# Patient Record
(demographics unavailable — no encounter records)

---

## 2024-11-04 NOTE — HEALTH RISK ASSESSMENT
[Good] : ~his/her~  mood as  good [No falls in past year] : Patient reported no falls in the past year [Little interest or pleasure doing things] : 1) Little interest or pleasure doing things [Feeling down, depressed, or hopeless] : 2) Feeling down, depressed, or hopeless [0] : 2) Feeling down, depressed, or hopeless: Not at all (0) [PHQ-2 Negative - No further assessment needed] : PHQ-2 Negative - No further assessment needed [[id number]] : 0 [Never] : Never [Change in mental status noted] : No change in mental status noted [Language] : denies difficulty with language [Behavior] : denies difficulty with behavior [None] : None [With Significant Other] : lives with significant other [] :  [Fully functional (bathing, dressing, toileting, transferring, walking, feeding)] : Fully functional (bathing, dressing, toileting, transferring, walking, feeding) [Fully functional (using the telephone, shopping, preparing meals, housekeeping, doing laundry, using] : Fully functional and needs no help or supervision to perform IADLs (using the telephone, shopping, preparing meals, housekeeping, doing laundry, using transportation, managing medications and managing finances) [Reports changes in hearing] : Reports no changes in hearing

## 2024-11-04 NOTE — HISTORY OF PRESENT ILLNESS
[de-identified] : Pt presents to the office today for annual wellness exam and FBW Pt has been feeling well overall.  Diet has improved and pt lost 20lbs Still not able to exercise secondary to knee pains  Neurology no follow up needed Urology Next month Cardiology next month  Last colonoscopy at 72 no further testing needed.  No polyps

## 2024-11-04 NOTE — PLAN
[FreeTextEntry1] : FBW done in office today  Continue specialist follow ups See Dermatology for skin cancer screen Continue with current medications Follow up again with me in 6 months or before then if needed

## 2024-11-12 NOTE — IMAGING
[Left] : left shoulder [Degenerative change] : Degenerative change [Glenohumeral arthritis] : Glenohumeral arthritis

## 2024-11-12 NOTE — HISTORY OF PRESENT ILLNESS
[6] : 6 [0] : 0 [Localized] : localized [Retired] : Work status: retired [de-identified] : 11/12/24: Here for follow up.  he noticed increasing pain in the left shoulder.  No specific injury.  He feels pain in the front.  He hears cracking the shoulder.  No treatment.   The GH injection on the R shoulder worked very well, he still feels relief.   2/9/24: 76 yo RHD male with bilateral shoulder pain since 2/7/24. He reports waking up with left shoulder pain, no specific injury. He saw Dr. Bar and took muscle relaxer. He reports increased pain in his right shoulder now. There is pain when turning his head. He is unable to take NSAIDs due to prior kidney CA.  PMHx: prostate CA [] : Post Surgical Visit: no [FreeTextEntry5] : Here for LT shoulder pain. RT hand dominant. Patient states no injury. A couple weeks ago patient has been noticing localized pain in LT shoulder. Patient states he is getting his knee replaced so when he gets up to help support the knee he usses his left shoulder to press down. Mobility and strength are there, he states it is an "annoyance" and some pain. No updates to medication.  [de-identified] : [facility]

## 2024-11-12 NOTE — PHYSICAL EXAM
[5 ___] : forward flexion 5[unfilled]/5 [5___] : internal rotation 5[unfilled]/5 [Left] : left shoulder [] : discomfort with strength testing [FreeTextEntry9] : FE: 150 ER: 50

## 2024-11-12 NOTE — ASSESSMENT
[FreeTextEntry1] : B/L GH DJD, cervical DDD. New xrays L shoulder reviewed.  Localized pain to AC joint today.  R GH injection given 2/9/24 with relief. L Ac injection today. RTO prn.  Procedure Note: Large Joint Injection was performed because of pain and inflammation, failure of conservative treatment.   Medications: Depo-Medrol: 1 cc, 80 mg. Lidocaine: 1 cc, 1%.  Marcaine: 1 cc, .25%.   Medication was injected in the left acromioclavicular joint. Patient has tried OTC's including aspirin, Ibuprofen, Aleve etc or prescription NSAIDs, and/or exercises at home and/ or physical therapy without satisfactory response. The risks, benefits, and alternatives to cortisone injection were explained in full to the patient. Risks outlined include but are not limited to infection, sepsis, bleeding, scarring, skin discoloration, temporary increase in pain, syncopal episode, failure to resolve symptoms, allergic reaction, symptom recurrence, and elevation of blood sugar in diabetics. Patient understood the risks. All questions were answered. After discussion of options, patient requested an injection. Oral informed consent was obtained and sterile prep of the injection site was performed using alcohol. Sterile technique was utilized for the procedure including the preparation of the solutions used for the injection. Ethyl chloride spray was used topically.  Sterile technique used. Patient tolerated procedure well. Post Procedure Instructions: Patient was advised to call if redness, pain, or fever occur and apply ice for 15 min. out of every hour for the next 12-24 hours as tolerated. patient was advised to rest the joint(s) for 2 days.  Ultrasound Guidance was used for the following reasons: to visualize the AC joint. Ultrasound guided injection was performed of the shoulder, visualization of the needle and placement of injection was performed without complication.

## 2024-12-05 NOTE — HISTORY OF PRESENT ILLNESS
[FreeTextEntry1] : 77 Y/O male PMH: HTN, HLD, GLENN treated with CPAP is not using CPAP here today for  cardiac evaluation fCP/SOB/Palpitations/dizziness/syncope. Pt had successful nephrectomy 8/5/22. Previous notes were reviewed. Previous labs reviewed. Previous PMD notes were reviewed. Pt continues to be overweight but is unable to exercise because knee pain for which he is receiving injections. He denies chest pain or shortness of breath. He is asymptomatic walking up and down stairs at home.   Medications/Testing reviewed  Echo reviewed EKG NSR

## 2024-12-05 NOTE — DISCUSSION/SUMMARY
[FreeTextEntry1] :  Pt is hemodynamically compensated. He will follow up in  6 mos. He was encouraged to exercise and lose weight. Labs and PMD notes were reviewed.Lipids were reviewed. Echo ordered to evaluate valvular disease. [EKG obtained to assist in diagnosis and management of assessed problem(s)] : EKG obtained to assist in diagnosis and management of assessed problem(s)

## 2024-12-12 NOTE — PROCEDURE
[Large Joint Injection] : Large joint injection [Bilateral] : bilaterally of the [Knee] : knee [Pain] : pain [Alcohol] : alcohol [Betadine] : betadine [Ethyl Chloride sprayed topically] : ethyl chloride sprayed topically [Sterile technique used] : sterile technique used [___ cc    32 units 5mg] : Zilretta ~Vcc of 32 units 5 mg  [] : Patient tolerated procedure well [Patient was advised to rest the joint(s) for ____ days] : patient was advised to rest the joint(s) for [unfilled] days [Risks, benefits, alternatives discussed / Verbal consent obtained] : the risks benefits, and alternatives have been discussed, and verbal consent was obtained [Altered anatomic landmarks d/t erosive arthritis] : altered anatomic landmarks d/t erosive arthritis [All ultrasound images have been permanently captured and stored accordingly in our picture archiving and communication system] : All ultrasound images have been permanently captured and stored accordingly in our picture archiving and communication system [FreeTextEntry3] : Do not submerge underwater for 48 hours

## 2024-12-12 NOTE — DISCUSSION/SUMMARY
[de-identified] : I discussed repeat Zilretta injections in both knees. Risks including infection, swelling, stiffness, bleeding in addition to other associated risks with joint injection, benefits and alternatives were discussed with the patient He would like to do this He had seen Dr. Quan Cassidy for consult for total knee replacements.  Recommend he consider doing this.   Tylenol p.r.n. Unable to take NSAIDs because of history of kidney cancer and nephrectomy August 2022 He will avoid irritating activities Hyaluronate injections have not helped him   Impression: Severe osteoarthritis right knee Severe osteoarthritis left knee

## 2024-12-12 NOTE — HISTORY OF PRESENT ILLNESS
[Gradual] : gradual [Dull/Aching] : dull/aching [Constant] : constant [Retired] : Work status: retired [1] : 1 [Other:____] : [unfilled] [de-identified] : The patient has had increased pain in both knees over the past several weeks.  He has mild to moderate pain standing and walking.  Pain with stairs and movie sign.  The left knee bothers him more than the right.  He did have good improvement after previous Zilretta injections.  Doing home exercises.  No improvement after previous hyaluronate injections [] : Post Surgical Visit: no [de-identified] : 8/12/24 [de-identified] : Dr. Martin [de-identified] : 8/12/24

## 2025-02-25 NOTE — PHYSICAL EXAM
[Left] : left shoulder [5 ___] : forward flexion 5[unfilled]/5 [5___] : internal rotation 5[unfilled]/5 [] : positive Elena [FreeTextEntry9] : FE: 120A ER: 30A

## 2025-02-25 NOTE — ASSESSMENT
[FreeTextEntry1] : B/L GH DJD, cervical DDD. New xrays L shoulder reviewed.  Localized pain to AC joint today.  R GH injection given 2/9/24 with relief. Repeat L AC injection today. RTO prn.  Procedure Note: Large Joint Injection was performed because of pain and inflammation, failure of conservative treatment.   Medications: Depo-Medrol: 1 cc, 80 mg. Lidocaine: 1 cc, 1%.  Marcaine: 1 cc, .25%.   Medication was injected in the left acromioclavicular joint. Patient has tried OTC's including aspirin, Ibuprofen, Aleve etc or prescription NSAIDs, and/or exercises at home and/ or physical therapy without satisfactory response. The risks, benefits, and alternatives to cortisone injection were explained in full to the patient. Risks outlined include but are not limited to infection, sepsis, bleeding, scarring, skin discoloration, temporary increase in pain, syncopal episode, failure to resolve symptoms, allergic reaction, symptom recurrence, and elevation of blood sugar in diabetics. Patient understood the risks. All questions were answered. After discussion of options, patient requested an injection. Oral informed consent was obtained and sterile prep of the injection site was performed using alcohol. Sterile technique was utilized for the procedure including the preparation of the solutions used for the injection. Ethyl chloride spray was used topically.  Sterile technique used. Patient tolerated procedure well. Post Procedure Instructions: Patient was advised to call if redness, pain, or fever occur and apply ice for 15 min. out of every hour for the next 12-24 hours as tolerated. patient was advised to rest the joint(s) for 2 days.  Ultrasound Guidance was used for the following reasons: to visualize the AC joint. Ultrasound guided injection was performed of the shoulder, visualization of the needle and placement of injection was performed without complication.

## 2025-02-25 NOTE — HISTORY OF PRESENT ILLNESS
[9] : 9 [1] : 2 [Localized] : localized [Sharp] : sharp [Throbbing] : throbbing [Sleep] : sleep [Retired] : Work status: retired [de-identified] : 2/25/25: Here for follow up, bilateral shoulders.  He was given L AC joint injection last visit with excellent relief up until this past Sunday.   Pain is anterior, worse with lifting and reaching. He has improved.   11/12/24: Here for follow up.  he noticed increasing pain in the left shoulder.  No specific injury.  He feels pain in the front.  He hears cracking the shoulder.  No treatment.   The GH injection on the R shoulder worked very well, he still feels relief.   2/9/24: 76 yo RHD male with bilateral shoulder pain since 2/7/24. He reports waking up with left shoulder pain, no specific injury. He saw Dr. Bar and took muscle relaxer. He reports increased pain in his right shoulder now. There is pain when turning his head. He is unable to take NSAIDs due to prior kidney CA.  PMHx: prostate CA [] : Post Surgical Visit: no [FreeTextEntry5] : LT CSI gave temporary relief until this past Sunday, he could not lift shoulder due to sharp pains... then this morning it calmed down and better he states. *Would like another CSI** [de-identified] : CSI [de-identified] : [facility]

## 2025-03-22 NOTE — IMAGING
[de-identified] :  RIGHT and LEFT KNEE Inspection:  mild effusion Palpation: medial joint line tenderness, anterior tenderness Knee Range of Motion:  3-125  Strength: 5/5 Quadriceps strength, 5/5 Hamstring strength Neurological: light touch is intact throughout Ligament Stability and Special Tests:  McMurrays: neg Lachman: neg Pivot Shift: neg Posterior Drawer: neg Valgus: neg Varus: neg Patella Apprehension: neg Patella Maltracking: neg

## 2025-03-22 NOTE — HISTORY OF PRESENT ILLNESS
[10] : 10 [Localized] : localized [Constant] : constant [Walking] : walking [Stairs] : stairs [de-identified] : 76M Here with bilateral knee pain. hx OA. He is considering TKA.   [] : Post Surgical Visit: no [FreeTextEntry1] : Left knee  [FreeTextEntry3] : 1 week ago [FreeTextEntry5] : pt states no known injury, he is unable to bend his knee. pt had a csi on 12/12/2024 which only lasted for 3 months  [de-identified] : movement  [de-identified] : 12/12/2024 [de-identified] : Veronica [de-identified] : 20-30 years ago [de-identified] : x-ray

## 2025-03-22 NOTE — ASSESSMENT
[FreeTextEntry1] : XR with severe B knee ASp tolerated well 45cc L, 60cc R Consult TKA Short supply tramadol

## 2025-04-24 NOTE — DISCUSSION/SUMMARY
[de-identified] : Diagnosis Knee Osteoarthritis   VILMA EDMOND 76 year  M was seen and evaluated in the office today. Following evaluation, and history of the patient's condition at length, the pathology was explained in full to the patient in layman's terms. Patient has Knee Osteoarthritis. Osteoarthritis is a degenerative joint disease where the cartilage in the knee gradually wears away, leading to pain, stiffness, and reduced mobility. Initially, symptoms may be mild, however this is a progressive condition, and the pain is expected to become more severe and persistent. Advanced stages of knee OA can result in significant disability, making daily activities challenging. I discussed with the patient that since this condition is expected to continue to worsen, they will eventually need a total knee replacement in their life time.   In the interim, discussed with patient several different treatment options regarding managing the gradual loss of function associated with knee OA, along with specific risks and benefits. Nonsurgical options including but not limited to Corticosteroid Injection, Visco Supplementation, Meloxicam 15mg, Medrol Dose Pack, along with activity modification such as non-impact exercise and organized physical therapy. The risk of morbidity associated with proposed treatments were discussed.   Furthermore, discussed with VILMA that they could also delay any immediate treatment options and continue to observe and self-care for the discussed problem. Discussed Home Exercise Programs as well as Rest, Ice and elevation. Patient had ample time to ask any questions about todays visit and the diagnosis, and all questions were answered. Patient verbally expressed they understand the plan going forward.  At this time, indicated patient for Meloxicam 15mg due to pain and inflammation of the knee. Upon review of patient medical history and confirmation with patient, patient had a nephrectomy. Discussed that due to this prescription NSAIDs are contraindicated.   Assessment & Indication: The patient has progressively severe knee pain and disability secondary to degenerative joint disease (DJD) and has failed extensive nontreatments, including activity modification, analgesic medications, and therapeutic exercise. Based on persistent symptoms and lack of response to non-surgical management, I have recommended a LEFT Total Knee Replacement (TKR).  Procedure Discussion: A detailed discussion was held regarding the nature of the total knee replacement procedure, including surgical steps, expected recovery timeline, and anticipated outcomes. A model of the implant to be used was utilized to demonstrate the various bearing surfaces and functionality.  Expected Recovery & Rehabilitation: The patient was informed of the expected time course for return of function and pain relief. We discussed the importance of compliance with postoperative instructions to facilitate optimal recovery and minimize complications. Emphasis was placed on active participation in rehabilitation and its impact on both short- and long-term outcomes. The discharge plan includes home care nursing and physical therapy, provided it is appropriate and covered by the patients insurance. The patient was encouraged to arrange for assistance at home following surgery.  Outcomes & Expectations: We reviewed the expected surgical outcomes, the likelihood of satisfaction after full recovery, and potential causes of dissatisfaction, including the possibility of recurrent pain, lack of improvement, or worsening pain.  Risks & Complications: The patient was informed in detail about the risks associated with total knee replacement, including but not limited to:  Surgical risks: Infection, wound complications, bone fracture, tendon or ligament injury, nerve injury, vascular injury, hemorrhage, stiffness, instability, persistent pain, fixation failure, need for reoperation or manipulation under anesthesia.  Perioperative medical risks: Deep vein thrombosis (DVT), pulmonary embolism, heart attack, stroke, cardiopulmonary complications, and other risks related to medical comorbidities, including elevated body mass index (BMI).  Anesthesia risks: Potential complications related to anesthesia, including death, were also discussed. Though I defer to the anesthesia team to discuss complications of anesthesia procedures.  To mitigate these risks, we will use sterile technique, perioperative antibiotics, and DVT prophylaxis when appropriate. The patient will be monitored postoperatively in the office setting to track recovery progress.  Implant Selection & Durability: We discussed the durability of prosthetic knees and potential long-term concerns, including wear, osteolysis, and loosening. I plan to use a [employer], possibly uncemented to be determined based on bone quality implant, which I feel most comfortable utilizing for primary TKR. If the patient prefers a different implant brand/type, they may request it, and I will consider the request or suggest an additional surgical opinion from a surgeon using that brand.  Preoperative Medical Clearance: The patient was advised of the need for a preoperative medical risk evaluation by their primary care physician (PCP). Additional subspecialty clearances, such as cardiac evaluation, may be required based on the PCPs assessment.  Informed Consent & Next Steps: The risks, benefits, and alternatives to surgery were discussed at length. The patient actively participated in the discussion, had their questions answered, and agreed to proceed with elective TKR. They were instructed to coordinate with my surgical team for scheduling, preoperative testing, and medical optimization.  Follow-up will be arranged for surgery, or sooner if needed.  Entered by Marcia Hu acting as scribe. Dr. Cassidy Attestation The documentation recorded by the scribe, in my presence, accurately reflects the service I, Dr. Cassidy, personally performed, and the decisions made by me with my edits as appropriate.

## 2025-04-24 NOTE — PHYSICAL EXAM
[Bilateral] : knee bilaterally [AP] : anteroposterior [Lateral] : lateral [[place]] : skyline [de-identified] : Constitutional: The patient appears well developed, well nourished. Examination of patients ability to communicate functionally was normal.       Neurologic: Coordination is normal. Alert and oriented to time, place and person. No evidence of mood disorder, calm affect.           LEFT  KNEE  : Inspection of the knee is as follows: moderate  TO LARGE effusion. no ecchymosis, no streaking, no erythema, no atrophy, no deformities of the quad tendon and no deformities of patellar tendon.     SCARS NOTED LEFT KNEE WITHOUT SIGNS OF INFECTION   Palpation of the knee is as follows: medial joint line tenderness, lateral joint line tenderness, medial facet of patella tenderness, lateral facet of patella tenderness and crepitus. no palpable masses and no increased warmth.       Knee Range of Motion is as follows in degrees:        Extension: 25    Flexion: 85        Strength examination of the knee is as follows:    Quadriceps strength is 5/5    Hamstring strength is 5/5       Ligament Stability and Special Test ligamentously stable, negative anterior draw, negative Lachman test, negative posterior draw and no varus or valgus instability.    negative McMurrays test and able to do active straight leg raise without an extensor lag.       Neurological examination of the knee is as follows: light touch is intact throughout.       Gait and function is as follows:  antalgic gait.          RIGHT    KNEE  : Inspection of the knee is as follows: moderate effusion. no ecchymosis, no streaking, no erythema, no atrophy, no deformities of the quad tendon and no deformities of patellar tendon.       Palpation of the knee is as follows: medial joint line tenderness, lateral joint line tenderness, medial facet of patella tenderness, lateral facet of patella tenderness and crepitus. no palpable masses and no increased warmth.       Knee Range of Motion is as follows in degrees:        Extension: 15    Flexion: 105        Strength examination of the knee is as follows:    Quadriceps strength is 5/5    Hamstring strength is 5/5       Ligament Stability and Special Test ligamentously stable, negative anterior draw, negative Lachman test, negative posterior draw and no varus or valgus instability.    negative McMurrays test and able to do active straight leg raise without an extensor lag.       Neurological examination of the knee is as follows: light touch is intact throughout.        [FreeTextEntry9] : ap/lat/sunrise taken 12/24 show b/l severe tricompartmental DJD near medial joint bone on bone contact and severe PF spurring/joint space narrowing KL grade 4. no frx noted.

## 2025-04-24 NOTE — HISTORY OF PRESENT ILLNESS
[de-identified] : 04/24/2025  VILMA EDMOND 76 year y/o M presents today for bilateral knee pain. Patient reports his left knee is worse than his right knee at this time.  He admits to decreased ROM, limping, difficulty with stairs.     Date of Onset: A few years, progressing over the last few weeks Mechanism of injury: NKI   Pain:    At Rest: 8/10    With Activity: 8/10   Have you been treated for this in the past? Patient reports he was recently in [facility] due to his knee pain. Patient reports he had IV steroids with relief. Patient reports he was admitted to a rehab facility for about 1 week.  Patient reports he has been recently treated by Dr. Martin. Patient reports he has had zilretta injections in bilateral knees (last injection was 12/12/24) and gel series with no relief. Patient reports he saw Dr. Cassidy about 2.5 years ago and was going to schedule a TKA but was dx with a cancerous mass on his kidney.  OTC/Rx Medication: Patient reports he has 1 kidney and cannot take NSAIDs. Approx 2.5 yrs ago he had nephrectomy due to renal cell CA Heat, Ice, Elevation: Previous Imaging/Studies: XR in office

## 2025-04-29 NOTE — ASSESSMENT
[FreeTextEntry1] : 4/29/25: Pt with adv varus b/l knee OA (L>R) with sig decreased ROM. Discussed treatment options- risks and benefits explained. Explained at this point due to his deformity and sig loss of motion would recommend proceeding surgically with TKA- unlikely will have sig long-term benefit from conservative tx. He would like to proceed with L TKA MAYTE first and R TKA MAYTE staged 6 weeks later. Explained he may have difficulty with postop recovery due to his very limited ROM in both knees- discussed importance of PT. Will book him for full stay as he has had trouble with kidney function and was recently discharged from rehab after having difficulty with ambulation. Will obtain CT scan [facility] protocol for both knees. Surgical details discussed. All pt questions answered. Will need oncologist clearance (monitoring his kidney function). Also will need Eliquis postop.

## 2025-04-29 NOTE — DISCUSSION/SUMMARY
[de-identified] : We had an extensive discussion regarding surgical intervention including risk, benefits and alternatives.  The risks include, but are not limited to infection, bleeding, injury to small nerves and blood vessels, pain, stiffness, phlebitis, DVT malunion, nonunion, and need for secondary procedures.  Preoperative, intraoperative and postoperative care were discussed and outlined to the patient as well.  The patient has had an opportunity to ask any question and all were answered to the best of my ability.  The natural progression of Osteoarthritis was explained to the patient. We discussed the possible treatment options from conservative to operative. These included NSAIDS, Glucosamine and Chondroitin sulfate, and Physical Therapy as well different types of injections. We also discussed that at some point they may progress to needing a TKA.  Information and pamphlets were given when appropriate.   Patient Complains of pain in Knee with a level that often reaches greater than an 8/10. The Pain has been progressively worsening of his/her treatment course. The pain has interfered with their ADLs and worsens with weight bearing. On exam they often have episodes of swelling/effusion with limited ROM. Pain worsens with ROM passive and active and I can palpate crepitus.   X-rays were reviewed with the patient, and they show joint space narrowing, subchondral sclerosis, osteophyte formation, and subchondral cysts.   After a period of more than 12 weeks physical therapy or exercise program done with me or another treating physician, they have continued pain. The patient has failed a trial of NSAID medication or pain relievers if they were unable to tolerate NSAID medications as well as a series of injection, steroid or Hyaluronic Acid. After a long discussion with the patient both the patient and I have decided we have exhausted all forms of less radical treatments, and they would like to proceed with Total Knee Replacement   We discussed my findings and the natural history of their condition. We talked about the details of the proposed surgery and the recovery. We discussed the material risks, possible benefits and alternatives to surgery. The risks include but are not limited to infection, bleeding and possible need for blood transfusion, fracture, bowel blockage, bladder retention or infection, need for reoperation, stiffness and/or limited range of motion, possible damage to nerves and blood vessels, failure of fixation of components, risk of deep vein thromboses and pulmonary embolism, wound healing problems, dislocation, and possible leg length discrepancy. Although incredibly rare, we also discussed the risks of a cardiac event, stroke and even death during, or following, the surgery. We discussed the type of implants the patient will be receiving and the type of fixation that will be used, as well as whether a robot or computer navigation aide will be used. The patient understands they will need medical clearance and will attend a preoperative joint education class. We also discussed the type of anesthesia they will receive, and the risks associated with hospital or rehab length of stay, obesity, diabetes and smoking.  Entered by Emilie Adam acting as a scribe.

## 2025-04-29 NOTE — HISTORY OF PRESENT ILLNESS
[de-identified] : 4/29/25: 76M here for b/l knee pain (L>R) x many years. Referred by Dr. Martin. Has had injections in the past- last one in Dec 2024- only provided about 3 months of relief. Reports going to the hospital a month ago after aspiration at [facility] on 3/22/25 by SOFIA Fernández- states he could not walk- went to rehab for a week- doing better now. Difficulty with stairs. Sig stiffness with sit to stand. Has been doing HEP since rehab. Rare Tylenol. 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 Has seen Veronica 4/2/25, Stated oa. Has tried ha and csi with temp relief.

## 2025-04-29 NOTE — IMAGING
[de-identified] : LEFT KNEE moderate effusion varus deformity +2 edema LE ROM 8-80 medial and lateral joint line tenderness crepitus  RIGHT KNEE ROM 10-90 varus deformity medial and lateral joint line tenderness crepitus  HIPS neg impingement good ROM of b/l hips   XR B/L KNEES (12/2/4/25) showing adv varus OA - KL 4

## 2025-05-05 NOTE — HEALTH RISK ASSESSMENT
[Little interest or pleasure doing things] : 1) Little interest or pleasure doing things [Feeling down, depressed, or hopeless] : 2) Feeling down, depressed, or hopeless [0] : 2) Feeling down, depressed, or hopeless: Not at all (0) [PHQ-2 Negative - No further assessment needed] : PHQ-2 Negative - No further assessment needed [[id number]] : 0 [Never] : Never

## 2025-05-05 NOTE — PLAN
[FreeTextEntry1] : Further improvement with diet Exercise as tolerated Pt will go for presurgical testing and will sign off on clearance.  FBW done in office today Continue with current medications

## 2025-05-05 NOTE — HISTORY OF PRESENT ILLNESS
[de-identified] : Pt presents to the office today for follow up with fasting blood work. Pt has been feeling well overall.  Still suffering with joint pains BL knees.  Will be having surgery at the end of the month Diet is fair  Exercise poor secondary to knee pains

## 2025-05-14 NOTE — ASSESSMENT
[FreeTextEntry1] :  here today for pre op cardiac evaluation prior to Left knee replacement scheduled for 5/21/25 will then anticipate right knee surgery 6 weeks after ( not scheduled as of yet)  will hold ASA 5 days prior to surgery  will need medical clearance   Pt is hemodynamically compensated asymptomatic from cardiac standpoint in setting of multiple cardiac risk factors he will return for Pharmacological Nuclear stress test, Echo ordered to evaluate valvular disease.  Formal clearance pending testing

## 2025-05-14 NOTE — HISTORY OF PRESENT ILLNESS
[FreeTextEntry1] : 78 yo  male PMH: HTN, HLD, GLENN treated with CPAP untreated, nephrectomy 8/5/22, here today for cardiac evaluation prior to Left knee replacement scheduled for 5/21/25 will then anticipate right knee surgery 6 weeks after ( not scheduled as of yet)   claims to be feeling well no CP/SOB/Palpitations/dizziness/syncope  Medications/Testing reviewed

## 2025-06-03 NOTE — HISTORY OF PRESENT ILLNESS
[] : Post Surgical Visit: yes [de-identified] : 6/3/25: 2 weeks s/p left TKA, not much pain, taking tylenol prn.  No fevers/chills.  Doing home PT.  Previous doc: 4/29/25: 76M here for b/l knee pain (L>R) x many years. Referred by Dr. Martin. Has had injections in the past- last one in Dec 2024- only provided about 3 months of relief. Reports going to the hospital a month ago after aspiration at [facility] on 3/22/25 by SOFIA Fernández- states he could not walk- went to rehab for a week- doing better now. Difficulty with stairs. Sig stiffness with sit to stand. Has been doing HEP since rehab. Rare Tylenol. 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

## 2025-06-03 NOTE — PHYSICAL EXAM
[de-identified] : Left knee: Inc c/d/i.  ROM 15-60.  Lig stable.  NVI.  Walker  Right knee: ROM 10-90. [Left] : left knee [AP] : anteroposterior [Lateral] : lateral [[place]] : skyline [Components well fixed, in good position] : Components well fixed, in good position

## 2025-06-03 NOTE — DISCUSSION/SUMMARY
[de-identified] : The patient is doing well at this time. The patient will be started on a course of physical therapy. I recommended that the patient works on range of motion at home and was shown how to do this. I encouraged the patient to increase ambulation. The patient can continue to take Tylenol for occasional discomfort. The patient was advised not to do any dental work for the first three months following the surgery. We will see the patient  back for a follow-up for a repeat evaluation. The patient  will call or return earlier for any questions or concerns.  Signs and symptoms of infection reviewed and patient advised to call immediately for redness, fevers, and/or chills.  I saw the patient under the supervision of Dr. Antunez and followed his plan of care.

## 2025-06-03 NOTE — ASSESSMENT
[FreeTextEntry1] : Previous doc: 4/29/25: Pt with adv varus b/l knee OA (L>R) with sig decreased ROM. Discussed treatment options- risks and benefits explained. Explained at this point due to his deformity and sig loss of motion would recommend proceeding surgically with TKA- unlikely will have sig long-term benefit from conservative tx. He would like to proceed with L TKA MAYTE first and R TKA MAYTE staged 6 weeks later. Explained he may have difficulty with postop recovery due to his very limited ROM in both knees- discussed importance of PT. Will book him for full stay as he has had trouble with kidney function and was recently discharged from rehab after having difficulty with ambulation. Will obtain CT scan [facility] protocol for both knees. Surgical details discussed. All pt questions answered. Will need oncologist clearance (monitoring his kidney function). Also will need Eliquis postop.  6/3/25: 2 weeks postop doing ok - min pain but ROM is 15-60 today, preop was 10-80.  Cont PT, discussed importance of stretching at home daily.  Return in 4 weeks.  Does not feel ready to proceed with right TKA yet.

## 2025-06-18 NOTE — PHYSICAL EXAM
[Normal Appearance] : normal appearance [Well Groomed] : well groomed [General Appearance - In No Acute Distress] : no acute distress [Edema] : no peripheral edema [Respiration, Rhythm And Depth] : normal respiratory rhythm and effort [Exaggerated Use Of Accessory Muscles For Inspiration] : no accessory muscle use [Abdomen Tenderness] : non-tender [Abdomen Soft] : soft [Costovertebral Angle Tenderness] : no ~M costovertebral angle tenderness [Urinary Bladder Findings] : the bladder was normal on palpation [Normal Station and Gait] : the gait and station were normal for the patient's age [No Focal Deficits] : no focal deficits [] : no rash [Oriented To Time, Place, And Person] : oriented to person, place, and time [Affect] : the affect was normal [Mood] : the mood was normal [No Palpable Adenopathy] : no palpable adenopathy [Chaperone Present] : A chaperone was present in the examining room during all aspects of the physical examination [FreeTextEntry2] : stephany

## 2025-07-01 NOTE — DISCUSSION/SUMMARY
[de-identified] : The patient was advised of the diagnosis.  The natural history of the pathology was explained in full to the patient in layman's terms. All questions were answered.  The risks and benefits of surgical and non-surgical treatment alternatives were explained in full to the patient.  I saw the patient under the supervision of Dr. Antunez and followed his plan of care.

## 2025-07-01 NOTE — ASSESSMENT
[FreeTextEntry1] : Previous doc: 4/29/25: Pt with adv varus b/l knee OA (L>R) with sig decreased ROM. Discussed treatment options- risks and benefits explained. Explained at this point due to his deformity and sig loss of motion would recommend proceeding surgically with TKA- unlikely will have sig long-term benefit from conservative tx. He would like to proceed with L TKA MAYTE first and R TKA MAYTE staged 6 weeks later. Explained he may have difficulty with postop recovery due to his very limited ROM in both knees- discussed importance of PT. Will book him for full stay as he has had trouble with kidney function and was recently discharged from rehab after having difficulty with ambulation. Will obtain CT scan [facility] protocol for both knees. Surgical details discussed. All pt questions answered. Will need oncologist clearance (monitoring his kidney function). Also will need Eliquis postop. 6/3/25: 2 weeks postop doing ok - min pain but ROM is 15-60 today, preop was 10-80.  Cont PT, discussed importance of stretching at home daily.  Return in 4 weeks.  Does not feel ready to proceed with right TKA yet.  7/1/25: Still with poor ROM, no effusion - discussed need for DEION and cont PT.

## 2025-07-01 NOTE — HISTORY OF PRESENT ILLNESS
[de-identified] : 7/1/25: 6 weeks postop still very stiff.  No fevers/chills.  Previous doc: 4/29/25: 76M here for b/l knee pain (L>R) x many years. Referred by Dr. Martin. Has had injections in the past- last one in Dec 2024- only provided about 3 months of relief. Reports going to the hospital a month ago after aspiration at [facility] on 3/22/25 by SOFIA Fernández- states he could not walk- went to rehab for a week- doing better now. Difficulty with stairs. Sig stiffness with sit to stand. Has been doing HEP since rehab. Rare Tylenol. 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  No fevers/chills.  Doing home PT.

## 2025-07-01 NOTE — PHYSICAL EXAM
[Left] : left knee [AP] : anteroposterior [Lateral] : lateral [[place]] : skyline [Components well fixed, in good position] : Components well fixed, in good position [de-identified] : Left knee: Inc healed.  ROM 15-45.  Lig stable.  NVI.  Cane.  Right knee: ROM 10-85.